# Patient Record
Sex: FEMALE | Race: WHITE
[De-identification: names, ages, dates, MRNs, and addresses within clinical notes are randomized per-mention and may not be internally consistent; named-entity substitution may affect disease eponyms.]

---

## 2020-10-05 ENCOUNTER — HOSPITAL ENCOUNTER (OUTPATIENT)
Dept: HOSPITAL 47 - RADMAMWWP | Age: 50
Discharge: HOME | End: 2020-10-05
Attending: FAMILY MEDICINE
Payer: COMMERCIAL

## 2020-10-05 DIAGNOSIS — Z12.31: Primary | ICD-10-CM

## 2020-10-05 PROCEDURE — 77067 SCR MAMMO BI INCL CAD: CPT

## 2020-10-05 PROCEDURE — 77063 BREAST TOMOSYNTHESIS BI: CPT

## 2020-10-06 NOTE — MM
Reason for exam: screening  (asymptomatic).

Last mammogram was performed 1 year and 8 months ago.



History:

Patient is postmenopausal.

Took hormonal contraceptives for 20 years.



Physical Findings:

A clinical breast exam by your physician is recommended on an annual basis and 

results should be correlated with mammographic findings.



MG 3D Screening Mammo W/Cad

Bilateral CC and MLO view(s) were taken.

Prior study comparison: January 23, 2019, bilateral MG 3d diag mammo w/cad [facility].

The breast tissue is heterogeneously dense. This may lower the sensitivity of 

mammography.  There are benign appearing round calcifications bilaterally. 

Asymmetric breast tissue left inferior, stable. There is no discrete abnormality.





ASSESSMENT: Benign, BI-RAD 2



RECOMMENDATION:

Routine screening mammogram of both breasts in 1 year.

## 2022-04-15 ENCOUNTER — HOSPITAL ENCOUNTER (OUTPATIENT)
Dept: HOSPITAL 47 - RADMAMWWP | Age: 52
Discharge: HOME | End: 2022-04-15
Attending: FAMILY MEDICINE
Payer: COMMERCIAL

## 2022-04-15 DIAGNOSIS — Z12.31: Primary | ICD-10-CM

## 2022-04-15 DIAGNOSIS — Z78.0: ICD-10-CM

## 2022-04-15 PROCEDURE — 77063 BREAST TOMOSYNTHESIS BI: CPT

## 2022-04-15 PROCEDURE — 77067 SCR MAMMO BI INCL CAD: CPT

## 2022-04-20 NOTE — MM
Reason for exam: screening  (asymptomatic).

Last mammogram was performed 1 year and 6 months ago.



History:

Patient is postmenopausal.

Took hormonal contraceptives for 20 years.



Physical Findings:

A clinical breast exam by your physician is recommended on an annual basis and 

results should be correlated with mammographic findings.



MG 3D Screening Mammo W/Cad

Bilateral CC and MLO view(s) were taken.

Prior study comparison: October 5, 2020, bilateral MG 3d screening mammo w/cad.  

January 23, 2019, bilateral MG 3d diag mammo w/cad [facility].

There are scattered fibroglandular densities.  No significant changes when 

compared with prior studies.





ASSESSMENT: Benign, BI-RAD 2



RECOMMENDATION:

Routine screening mammogram of both breasts in 1 year.

## 2024-09-27 ENCOUNTER — HOSPITAL ENCOUNTER (OUTPATIENT)
Dept: HOSPITAL 47 - RADMAMWWP | Age: 54
Discharge: HOME | End: 2024-09-27
Attending: FAMILY MEDICINE
Payer: COMMERCIAL

## 2024-09-27 PROCEDURE — 77067 SCR MAMMO BI INCL CAD: CPT

## 2024-09-27 PROCEDURE — 77063 BREAST TOMOSYNTHESIS BI: CPT

## 2024-09-29 NOTE — MM
Reason for Exam: Screening  (asymptomatic). 

Last mammogram was performed 1 year(s) and 2 month(s) ago. 





Patient History: 

Menarche at age 14. First Full-Term Pregnancy at age 28. Postmenopausal. Patient has history of

breast feeding. Patient used Hormonal Contraceptives for 20 years. 





Risk Values: 

Roxi 5 year model risk: 1.2%.

NCI Lifetime model risk: 8.3%.





Prior Study Comparison: 

10/5/2020 Bilateral Screening Mammogram, [facility]. 4/15/2022 Bilateral Screening Mammogram, [facility].

7/25/2023 Bilateral MG 3D screening mammo w/cad, [facility]. 





Tissue Density: 

There are scattered areas of fibroglandular density.





Findings: 

Analyzed By CAD. 

The pattern is symmetrical.

Pattern is stable

No suspicious groups of microcalcifications, spiculated or lobular masses, architectural distortion

or other secondary signs of malignancy are mammographically apparent. 





Overall Assessment: Benign, BI-RAD 2





Management: 

Screening Mammogram of both breasts in 1 year.

A negative mammogram report should not preclude additional follow up of suspicious palpable

abnormalities.

Patient should continue monthly self breast exam.

A clinical breast exam by your physician is recommended on an annual basis and results should be

correlated with mammographic findings.



Note on Roxi scores and lifetime risk:

1. A Roxi score greater than 3% is considered moderate risk. If this is the case, consider

specialist referral to assess eligibility for a risk reducing agent.

2. If overall lifetime risk for the development of breast cancer is 20% or higher, the patient may

qualify for future screening with alternating mammogram and breast MRI.



[facility], Workstation: [id number], 9/29/2024 12:05 PM.



Electronically signed and approved by: Freddy Silverio D.O. Radiologis